# Patient Record
Sex: FEMALE
[De-identification: names, ages, dates, MRNs, and addresses within clinical notes are randomized per-mention and may not be internally consistent; named-entity substitution may affect disease eponyms.]

---

## 2018-11-12 ENCOUNTER — HOSPITAL ENCOUNTER (EMERGENCY)
Dept: HOSPITAL 25 - UCEAST | Age: 23
Discharge: HOME | End: 2018-11-12
Payer: COMMERCIAL

## 2018-11-12 VITALS — DIASTOLIC BLOOD PRESSURE: 79 MMHG | SYSTOLIC BLOOD PRESSURE: 134 MMHG

## 2018-11-12 DIAGNOSIS — K37: ICD-10-CM

## 2018-11-12 DIAGNOSIS — K27.9: ICD-10-CM

## 2018-11-12 DIAGNOSIS — B96.81: ICD-10-CM

## 2018-11-12 DIAGNOSIS — B34.9: ICD-10-CM

## 2018-11-12 DIAGNOSIS — K29.70: Primary | ICD-10-CM

## 2018-11-12 PROCEDURE — 99212 OFFICE O/P EST SF 10 MIN: CPT

## 2018-11-12 PROCEDURE — G0463 HOSPITAL OUTPT CLINIC VISIT: HCPCS

## 2018-11-12 NOTE — UC
Abdominal Pain Female HPI





- HPI Summary


HPI Summary: 





Patient presents with an unremarkable past medical history. She presents with 

complaints of epigastrium pain onset 4-5 days. She states the pain does not 

radiate, and not influenced by food or any empty stomach. She describes it as 

sharp ad a times will have some dizziness.  She denies fever, chills, nausea, 

vomiting, diarrhea, dysuria, abnormal vaginal discharge or bleeding. She denies 

any travel, but does report she had had ill co-workers. 





- History of Current Complaint


Chief Complaint: UCAbdominalPain


Stated Complaint: ABD PAIN


Time Seen by Provider: 11/12/18 17:20


Hx Obtained From: Patient


Hx Last Menstrual Period: 10/20/18


Pregnant?: No - LMP 3 week ago. 


Onset/Duration: Lasting Hours


Timing: Intermittent Episodes Lasting:


Severity Initially: Moderate


Severity Currently: None


Pain Intensity: 4


Location: Epigastric


Radiates: No


Character: Sharp


Aggravating Factor(s): Nothing


Alleviating Factor(s): Nothing


Associated Signs and Symptoms: Positive: Dizzy


Allergies/Adverse Reactions: 


 Allergies











Allergy/AdvReac Type Severity Reaction Status Date / Time


 


No Known Allergies Allergy   Verified 11/12/18 17:15











Home Medications: 


 Home Medications





Acetaminophen [Pain Relief] 325 mg PO ONCE PRN 11/12/18 [History Confirmed 11/12 /18]











PMH/Surg Hx/FS Hx/Imm Hx


Previously Healthy: Yes





- Surgical History


Surgical History: None





- Family History


Known Family History: 


   Negative: Cardiac Disease, Hypertension





- Social History


Occupation: Student


Lives: Alone


Alcohol Use: None


Substance Use Type: None


Smoking Status (MU): Never Smoked Tobacco





Review of Systems


All Other Systems Reviewed And Are Negative: Yes


Constitutional: Positive: Negative


Skin: Positive: Negative


Eyes: Positive: Negative


ENT: Positive: Negative


Respiratory: Positive: Negative


Cardiovascular: Positive: Negative


Gastrointestinal: Positive: Abdominal Pain


Genitourinary: Positive: Negative


Motor: Positive: Negative


Neurovascular: Positive: Negative


Musculoskeletal: Positive: Negative


Neurological: Positive: Negative


Psychological: Positive: Negative





Physical Exam


Triage Information Reviewed: Yes


Appearance: Well-Appearing


Vital Signs: 


 Initial Vital Signs











Temp  99 F   11/12/18 17:10


 


Pulse  78   11/12/18 17:10


 


Resp  18   11/12/18 17:10


 


BP  134/79   11/12/18 17:10


 


Pulse Ox  100   11/12/18 17:10











Vital Signs Reviewed: Yes


Eye Exam: Normal


ENT Exam: Normal


Neck exam: Normal


Respiratory Exam: Normal


Abdominal Exam: Other - tenderness on palpation of the epigasrium


Bowel Sounds: Positive: Present


Skin Exam: Normal





Abd Pain Female Course/Dx





- Course


Course Of Treatment: Patient presents with an unremarkable past medical 

history. She presents with 4-5 days of epigastrium pain that has been 

intermittent. On exam she was not having pain, normal vital signs, and nontoxic 

appearance. I discussed with her that if she wanted to have a complete work-up 

today she would have to go to the ER . I discussed differential possibilities 

of this type of pain could be gallbladder disease, PUD, viral syndrome, early 

appendicits. I also discussed my limitations at this  site at this time to 

throughly evaluate and precisly diagnose her symptoms today. However, based on 

her presentation, clincial findings and stability I thought it reasonable to 

treat her conservativity with a trial of carafate 1 g, QID and that she should 

schedule an appointment with her PCP or if her symptoms became worse to go to 

the ER at once.





- Differential Dx/Diagnosis


Differential Diagnosis: Other - ABDOMINAL PAIN GASTRITIS


Provider Diagnoses: ABDOMINAL PAIN.  GASTRITIS.  PUD.  Early appendicitis.  H. 

pylori.  viral syndome





Discharge





- Sign-Out/Discharge


Documenting (check all that apply): Patient Departure


All imaging exams completed and their final reports reviewed: No Studies





- Discharge Plan


Condition: Stable


Disposition: HOME


Prescriptions: 


Sucralfate TAB* [Carafate*] 1 gm PO QID #40 tab


Patient Education Materials:  Gastritis (DC)


Referrals: 


No Primary Care Phys,NOPCP [Primary Care Provider] - 


Additional Instructions: 


Patient understands that I am treating her based on my clinical suspicion and I 

was not able to perform any testing that would prove this at this time. Patient 

will follow up with pcp. 





- Billing Disposition and Condition


Condition: STABLE


Disposition: Home

## 2019-06-22 ENCOUNTER — HOSPITAL ENCOUNTER (INPATIENT)
Dept: HOSPITAL 25 - MCHOBOUT | Age: 24
LOS: 3 days | Discharge: HOME | DRG: 560 | End: 2019-06-25
Attending: ADVANCED PRACTICE MIDWIFE | Admitting: ADVANCED PRACTICE MIDWIFE
Payer: COMMERCIAL

## 2019-06-22 DIAGNOSIS — Z3A.37: ICD-10-CM

## 2019-06-22 PROCEDURE — 86901 BLOOD TYPING SEROLOGIC RH(D): CPT

## 2019-06-22 PROCEDURE — 84550 ASSAY OF BLOOD/URIC ACID: CPT

## 2019-06-22 PROCEDURE — 86850 RBC ANTIBODY SCREEN: CPT

## 2019-06-22 PROCEDURE — 85025 COMPLETE CBC W/AUTO DIFF WBC: CPT

## 2019-06-22 PROCEDURE — 80307 DRUG TEST PRSMV CHEM ANLYZR: CPT

## 2019-06-22 PROCEDURE — 80053 COMPREHEN METABOLIC PANEL: CPT

## 2019-06-22 PROCEDURE — 84112 EVAL AMNIOTIC FLUID PROTEIN: CPT

## 2019-06-22 PROCEDURE — 36415 COLL VENOUS BLD VENIPUNCTURE: CPT

## 2019-06-22 PROCEDURE — 86900 BLOOD TYPING SEROLOGIC ABO: CPT

## 2019-06-23 LAB
ALBUMIN SERPL BCG-MCNC: 3.8 G/DL (ref 3.2–5.2)
ALBUMIN/GLOB SERPL: 1.1 {RATIO} (ref 1–3)
ALP SERPL-CCNC: 148 U/L (ref 34–104)
ALT SERPL W P-5'-P-CCNC: 16 U/L (ref 7–52)
ANION GAP SERPL CALC-SCNC: 12 MMOL/L (ref 2–11)
AST SERPL-CCNC: 25 U/L (ref 13–39)
BASOPHILS # BLD AUTO: 0.1 10^3/UL (ref 0–0.2)
BUN SERPL-MCNC: 11 MG/DL (ref 6–24)
BUN/CREAT SERPL: 15.3 (ref 8–20)
CALCIUM SERPL-MCNC: 9.5 MG/DL (ref 8.6–10.3)
CHLORIDE SERPL-SCNC: 104 MMOL/L (ref 101–111)
EOSINOPHIL # BLD AUTO: 0 10^3/UL (ref 0–0.6)
GLOBULIN SER CALC-MCNC: 3.6 G/DL (ref 2–4)
GLUCOSE SERPL-MCNC: 137 MG/DL (ref 70–100)
HCO3 SERPL-SCNC: 19 MMOL/L (ref 22–32)
HCT VFR BLD AUTO: 34 % (ref 35–47)
HGB BLD-MCNC: 11.2 G/DL (ref 12–16)
LYMPHOCYTES # BLD AUTO: 0.9 10^3/UL (ref 1–4.8)
MCH RBC QN AUTO: 26 PG (ref 27–31)
MCHC RBC AUTO-ENTMCNC: 33 G/DL (ref 31–36)
MCV RBC AUTO: 78 FL (ref 80–97)
MONOCYTES # BLD AUTO: 0.5 10^3/UL (ref 0–0.8)
NEUTROPHILS # BLD AUTO: 13.1 10^3/UL (ref 1.5–7.7)
NRBC # BLD AUTO: 0 10^3/UL
NRBC BLD QL AUTO: 0
PLATELET # BLD AUTO: 295 10^3/UL (ref 150–450)
POTASSIUM SERPL-SCNC: 4 MMOL/L (ref 3.5–5)
PROT SERPL-MCNC: 7.4 G/DL (ref 6.4–8.9)
RBC # BLD AUTO: 4.35 10^6 /UL (ref 3.7–4.87)
SODIUM SERPL-SCNC: 135 MMOL/L (ref 135–145)
URATE SERPL-MCNC: 6.7 MG/DL (ref 2.3–6.6)
WBC # BLD AUTO: 14.6 10^3/UL (ref 3.5–10.8)

## 2019-06-23 PROCEDURE — 4A1HXCZ MONITORING OF PRODUCTS OF CONCEPTION, CARDIAC RATE, EXTERNAL APPROACH: ICD-10-PCS | Performed by: ADVANCED PRACTICE MIDWIFE

## 2019-06-23 RX ADMIN — DOCUSATE SODIUM SCH MG: 100 CAPSULE, LIQUID FILLED ORAL at 10:06

## 2019-06-23 RX ADMIN — IBUPROFEN PRN MG: 600 TABLET, FILM COATED ORAL at 20:29

## 2019-06-23 RX ADMIN — DOCUSATE SODIUM SCH MG: 100 CAPSULE, LIQUID FILLED ORAL at 15:08

## 2019-06-23 RX ADMIN — DOCUSATE SODIUM SCH MG: 100 CAPSULE, LIQUID FILLED ORAL at 20:29

## 2019-06-23 NOTE — PN
Progress Note





- Progress Note


Date of Service: 06/23/19


Note: 





Call from RN, pt feels like she needs to have a bowel movement


Pt resting in bed, comfortable, with CEI infusing


VE: 10/100/+2


FHR: 125, moderate variability, +accels, occasional variable decel - no 

evidence of fetal acidemia


Contractions: q3-4mins - regular contraction pattern


Requesting NICU presence given GA


Instructed pt on how to push. Pt to begin pushing.

## 2019-06-23 NOTE — HP
General Information





- Reason for Visit





, IUP@37+1, SROM, labor








- General Information


Maternal Age: 23


Grav: 1


Para: 0


SAB: 0


IEA: 0





Estimated Due Date: 19


Determined By: Early Ultrasound


Gestational Age in Weeks/Days: 37+1


Maternal Blood Type and Rh: O Positive





- Results this Pregnancy


Serology/RPR Result: Non-Reactive


Rubella Result: Immune


HBsAg Result: Negative


HIV Result: Negative


GBS Culture Result: Negative





Past Medical History


Pertinent Past Medical History: Non-Contributory


Past Medical History Comment: 





No current medical problems 


Pertinent Past Surgical History: None


Pertinent Family History: Non-Contributory





- Antepartal Records


Antepartal Records: Reviewed, Pregnancy Uncomplicated





Review of Systems


Constitutional: Uncomfortable


CV Complaint: No


Respiratory: Shortness of Breath: No


Gastrointestinal: No Nausea/Vomiting, Normal Bowel Movement


Genitourinary: Leaking Fluid, No Dysuria, No Bleeding


Musculoskeletal: Back Pain, Contractions


Neurological: No Headache, No Visual Changes


Fetal Movement: Normal





Exam


Allergies/Adverse Reactions: 


Allergies





No Known Allergies Allergy (Verified 19 23:09)


 











Temp 98.7, HR 88, RR 18, 145/91, O2 99


Lab Values - Entire Visit: 


 Laboratory Tests











  19





  22:45 23:55


 


WBC   14.6 H


 


RBC   4.35


 


Hgb   11.2 L


 


Hct   34 L


 


MCV   78 L


 


MCH   26 L


 


MCHC   33


 


RDW   15


 


Plt Count   295


 


MPV   8.6


 


Neut % (Auto)   89.9


 


Lymph % (Auto)   6.3


 


Mono % (Auto)   3.2


 


Eos % (Auto)   0.0


 


Baso % (Auto)   0.6


 


Absolute Neuts (auto)   13.1 H


 


Absolute Lymphs (auto)   0.9 L


 


Absolute Monos (auto)   0.5


 


Absolute Eos (auto)   0.0


 


Absolute Basos (auto)   0.1


 


Absolute Nucleated RBC   0.0


 


Nucleated RBC %   0.0


 


Vag Amniotic Fld Detect  Positive 














- Measurements


Height: 5 ft 5 in


Weight: 139 lb


Weight in lbs: 139.027451


Body Mass Index (BMI): 23.1


Pre-Pregnancy Weight: 108 lb


Weight Gained This Pregnancy: 31 lbs and 0 ozs





- Exam


Breast: Breast Exam Deferred


CVA: No CVA Tenderness


Extremities: No Edema


Heart: Normal Rhythm/Heart Sounds


HEENT: No Significant Findings


Lungs: Clear Bilaterally


Rectal: Rectal Exam Deferred


Reflexes: DTR 2+





- Abdominal Exam


Abdomen Exam: Non-Tender





- Ultrasound/Biophysical Profile


Ultrasound Status: Not Done





Targeted Exam Findings


Estimated Fetal Weight: 6lbs


Cervical Exam: 3cm - Exam by RN


Effacement: 70%


Station: -1


Presenting Part: Vertex


Membrane Status: SROM


Amniotic Fluid Evaluation: Positive ROM Plus





EFM Findings





- External Fetal Monitor Findings


Baseline Fetal Heart Rate: 130


External Fetal Monitor Findings: Accelerations Present, Variability Moderate - 

Period of consistent variable decels with contractions, resolved with fluids, O2

, position changes


Contractions: Regular - q2-3 mins





Assessment/Plan





- Assessment





, IUP@37+1, SROM/Labor


GBS negative, O+


SROM 0900, clear fluid, afebrile


No evidence of fetal metabolic acidemia


Regular contractions, pt uncomfortable 


PARQ discussion about pain medication options. Pt requesting epidural. 


Pt with elevated BPs on admission, +1 UA dip towards end of pregnancy 











- Plan


Plan: Admit - Anticipate Vaginal Delivery


Plan Comment: 





Admit to L&D


Epidural now, anesthesia aware


VE PRN


PEC labs


anticipate progression to 





- Date/Time of Admission


Date of Admission: 19


Time of Admission: 23:30

## 2019-06-23 NOTE — PROCNOTE
[facility] OB: Delivery Note





- Delivery


  ** A


Date of Birth: [birth year]


Time of Birth: 04:58


 Sex: Male


[place] Weight at Birth: 5 lb


Apgar Score 1 Minute: 7


Apgar Score 5 Minutes: 9


Gestational Age in Weeks and Days at Delivery: 37 Weeks and 2 Days


Delivery Method: Spontaneous Vaginal


Labor: Spontaneous


Did Patient attempt ?: N/A, No Previous 


Amniotic Fluid: Clear


Estimated Blood Loss: 250


Anesthesia/Analgesia: CEI for Labor


Delivered By: Kaylene Edwards





- Perineum


Perineal Injury: Abrasion Only - Not Repaired


Perineal Injury Comment: Abrasion - right labium 


Perineal Repair: None





- Events


Delivery Events of Note: Pitocin Only After Delivery


Delivery Events of Note Comment: Nuchal cord x1, reduced on the perineum





- Additional Delivery Notes


Additional Delivery Notes: 





22 yo  at 37+2 weeks admitted in active labor. SROM at 0900 on , clear 

fluid.  Progressed to complete with CEI infusing. Pt began pushing 

spontaneously at 0349 on . Birth of liveborn male at 0458. OA to CRISTELA, 

shoulder followed easily, nuchal x1 - reduced on the perineum. Infant to mother'

s chest, dried and stimulated with spontaneous cry. Apgar's 9 and 9. Birth 

weight 2240 grams, 5lbs. Considered SGA - hypoglycemia protocol in place. Cord 

clamped and cut by delivering provider. Spontaneous marlene delivery of intact 

placenta at 0506. Active management of 3rd stage, Pitocin infusing, fundus 

firm.  mL. After careful inspection an abrasion was noted on the right 

labium. Perineum intact. Infant at breast feeding. Mother and infant in stable 

condition at time of note.

## 2019-06-24 LAB
BASOPHILS # BLD AUTO: 0.1 10^3/UL (ref 0–0.2)
EOSINOPHIL # BLD AUTO: 0.1 10^3/UL (ref 0–0.6)
HCT VFR BLD AUTO: 28 % (ref 35–47)
HGB BLD-MCNC: 9.2 G/DL (ref 12–16)
LYMPHOCYTES # BLD AUTO: 1.7 10^3/UL (ref 1–4.8)
MCH RBC QN AUTO: 26 PG (ref 27–31)
MCHC RBC AUTO-ENTMCNC: 33 G/DL (ref 31–36)
MCV RBC AUTO: 78 FL (ref 80–97)
MONOCYTES # BLD AUTO: 0.6 10^3/UL (ref 0–0.8)
NEUTROPHILS # BLD AUTO: 10.1 10^3/UL (ref 1.5–7.7)
NRBC # BLD AUTO: 0 10^3/UL
NRBC BLD QL AUTO: 0
PLATELET # BLD AUTO: 232 10^3/UL (ref 150–450)
RBC # BLD AUTO: 3.58 10^6 /UL (ref 3.7–4.87)
WBC # BLD AUTO: 12.6 10^3/UL (ref 3.5–10.8)

## 2019-06-24 RX ADMIN — DOCUSATE SODIUM SCH MG: 100 CAPSULE, LIQUID FILLED ORAL at 08:38

## 2019-06-24 RX ADMIN — DOCUSATE SODIUM SCH MG: 100 CAPSULE, LIQUID FILLED ORAL at 22:16

## 2019-06-24 RX ADMIN — IBUPROFEN PRN MG: 600 TABLET, FILM COATED ORAL at 16:18

## 2019-06-24 RX ADMIN — Medication SCH MG: at 21:00

## 2019-06-24 RX ADMIN — IBUPROFEN PRN MG: 600 TABLET, FILM COATED ORAL at 03:16

## 2019-06-24 RX ADMIN — Medication SCH MG: at 08:38

## 2019-06-24 RX ADMIN — DOCUSATE SODIUM SCH MG: 100 CAPSULE, LIQUID FILLED ORAL at 14:03

## 2019-06-25 VITALS — DIASTOLIC BLOOD PRESSURE: 85 MMHG | SYSTOLIC BLOOD PRESSURE: 133 MMHG

## 2019-06-25 RX ADMIN — Medication SCH MG: at 08:38

## 2019-06-25 RX ADMIN — DOCUSATE SODIUM SCH MG: 100 CAPSULE, LIQUID FILLED ORAL at 08:37
